# Patient Record
Sex: FEMALE
[De-identification: names, ages, dates, MRNs, and addresses within clinical notes are randomized per-mention and may not be internally consistent; named-entity substitution may affect disease eponyms.]

---

## 2020-03-11 ENCOUNTER — HOSPITAL ENCOUNTER (EMERGENCY)
Dept: HOSPITAL 97 - ER | Age: 55
Discharge: HOME | End: 2020-03-11
Payer: COMMERCIAL

## 2020-03-11 VITALS — TEMPERATURE: 97.4 F | OXYGEN SATURATION: 97 % | DIASTOLIC BLOOD PRESSURE: 74 MMHG | SYSTOLIC BLOOD PRESSURE: 149 MMHG
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
## 2020-03-11 DIAGNOSIS — Z23: ICD-10-CM

## 2020-03-11 DIAGNOSIS — S61.012A: Primary | ICD-10-CM
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
## 2020-03-11 PROCEDURE — 90471 IMMUNIZATION ADMIN: CPT

## 2020-03-11 PROCEDURE — 90714 TD VACC NO PRESV 7 YRS+ IM: CPT

## 2020-03-11 PROCEDURE — 99282 EMERGENCY DEPT VISIT SF MDM: CPT

## 2020-03-11 NOTE — XMS REPORT
Summary of Care

 Created on:2020



Patient:Amanda Traylor

Sex:Female

:1965

External Reference #:[id number]





Demographics







 Address  [address] [place] [address]

 

 Mobile Phone  [phone]

 

 Home Phone  [phone]

 

 Phone  [phone]

 

 Email Address  [email or web address]

 

 Preferred Language  English

 

 Marital Status  

 

 Hindu Affiliation  Unknown

 

 Race  White

 

 Ethnic Group   or 









Author







 Organization  [facility]

 

 Address  [address] [place] [address]









Support







 Name  Relationship  Address  Phone

 

 Dilan Traylor  Unavailable  Unavailable  [phone]

 

 Shobha Traylor  Unavailable  Unavailable  [phone]

 

 Reena Traylor  Unavailable  Unavailable  [phone]









Care Team Providers







 Name  Role  Phone

 

 Cameron Gutiérrez MD  Primary Care Provider  [phone]









Reason for Visit







 Reason  Comments

 

 Work Excuse  







Encounter Details







 Date  Type  Department  Care Team  Description

 

 2020  Telephone  [facility] Orthopaedic  Waldo Grace MD



  Work Excuse



     Surgery- [facility]



  [address]



  [place], [place] [address] [place] [address] [phone] (Fax)  







Allergies







 Active Allergy  Reactions  Severity  Noted Date  Comments

 

 Ibuprofen  Swelling    2017  

 

 Meloxicam  Swelling    2019  



documented as of this encounter (statuses as of 2020)



Medications







 Medication  Sig  Dispensed  Refills  Start Date  End Date  Status

 

 HYDROcodone-acetaminophe  TK 1 TO 2 TS PO    0  10/26/2017    Active



 n 7.5-325 mg per tablet  Q 4 TO 6 H PRN P          

 

 methylPREDNISolone  Take  by mouth    0      Active



 (METHYLPRED DP) 4 mg  SEE-INSTRUCTIONS          



 tablets  . follow package          



   directions          

 

 gabapentin 300 mg  Take 1 capsule  60 capsule  2  2019  Active



 capsuleIndications:  by mouth 2 (two)        0  



 Tibialis posterior  times daily for          



 tendinopathy  90 days.          

 

 methocarbamol 500 mg  Take 1 tablet by  60 tablet  2  2019  
Active



 tabletIndications:  mouth 2 (two)        0  



 Tibialis posterior  times daily for          



 tendinopathy  90 days.          



documented as of this encounter (statuses as of 2020)



Active Problems







 Problem  Noted Date

 

 Obesity (BMI 30-39.9)  2019

 

 Equinovalgus, acquired, left  2019

 

 Tibialis posterior tendinopathy  2019

 

 Impingement syndrome of left ankle  2019

 

 Achilles tendon contracture, left  2019



documented as of this encounter (statuses as of 2020)



Social History







 Tobacco Use  Types  Packs/Day  Years Used  Date

 

 Never Smoker        









 Smokeless Tobacco: Never Used      









 Sex Assigned at Birth  Date Recorded

 

 Not on file  









 Job Start Date  Occupation  Industry

 

 Not on file  Not on file  Not on file









 Travel History  Travel Start  Travel End









 No recent travel history available.



documented as of this encounter



Last Filed Vital Signs

Not on filedocumented in this encounter



Plan of Treatment







 Date  Type  Specialty  Care Team  Description

 

 2020  Office Visit  Orthopedic Surgery  Waldo Grace MD



  



       [address] [place] [id number] [phone] (Fax)  









 Health Maintenance  Due Date  Last Done  Comments

 

 HEPATITIS C (HCV) SCREEN  1965    

 

 DTaP,Tdap,and Td Vaccines (1 -  1975    



 Tdap)      

 

 PAP SMEAR  1985    

 

 Breast Cancer Screening  2004    



 (MAMMOGRAM)      

 

 COLONOSCOPY  2014    

 

 Zoster Recombinant Vaccine  2014    



 (SHINGRIX) (1 of 2)      

 

 INFLUENZA VACCINE (#1)  2019    

 

 PNEUMOCOCCAL 0-64 YEARS COMBINED  Aged Out    No longer eligible based on



 SERIES      patient's age to complete this



       topic



documented as of this encounter



Implants







 Implanted  Type  Area    Device  Shelf  Model /



         Identifier  Expiration  Serial / Lot



           Date  

 

 Screw Bone Cannulated Compression Headless 7.5mm Estefanía 45mml  SCREW  Left:  
[employer]      -2257 /



 Implanted: Qty: 2 on 2019 by Waldo Grace MD at [facility] SPECIALTY 
[facility] [place] [facility]    Leg        N/A /



             [id number] [id number] 19

 

 Screw Bone Cannulated Compression Headless 4.7mm Estefanía 40mml  SCREW  Left:  
[employer]      -9102 /



 Implanted: Qty: 1 on 2019 [facility]        N/A /



             AUTO1 LOAD2 19

 

 Screw Bone Cannulated Compression Headless 4.7mm Estefanía 35mml  SCREW  Left:  
[employer]       /



 Implanted: Qty: 1 on 2019 by Waldo Grace MD at [facility] AT [facility]    Leg        N/A /



             AUTO1 LOAD2 19

 

 Cortical Block, [facility] Tri Freeze Dried 1.51.9 Cm #1033-12 
- [id number]  TISSUE  Left:  [employer]    2021  1033- /



 Implanted: Qty: 1 on 2019 by Waldo Grace MD at [facility] AT [facility]      [id number] /



             



documented as of this encounter



Results

Not on filedocumented in this encounter



Insurance







 Payer  Benefit Plan / Group  Subscriber ID  Effective Dates  Phone  Address  
Type

 

 AETNA  AETNA O  [id number]  2016-Present      HMO



documented as of this encounter

## 2020-03-11 NOTE — XMS REPORT
Summary of Care

 Created on:2019



Patient:Amanda Traylor

Sex:Female

:1965

External Reference #:[id number]





Demographics







 Address  [address] [place] [address]

 

 Mobile Phone  [phone]

 

 Home Phone  [phone]

 

 Phone  [phone]

 

 Email Address  [email or web address]

 

 Preferred Language  English

 

 Marital Status  

 

 Yazdanism Affiliation  Unknown

 

 Race  White

 

 Ethnic Group   or 









Author







 Organization  [facility]

 

 Address  [address] [place] [address]









Support







 Name  Relationship  Address  Phone

 

 Dilan Traylor  Unavailable  Unavailable  [phone]

 

 Reena Luu  Unavailable  Unavailable  [phone]

 

 Shobha Traylor  Unavailable  Unavailable  [phone]









Care Team Providers







 Name  Role  Phone

 

 Cameron Gutiérrez MD  Primary Care Provider  [phone]









Encounter Details







 Date  Type  Department  Care Team  Description

 

 2019  Orders Only  [facility]



  Doctor Unassigned, No  



     [address] [facility]



  Name



  



     [place] [address]  [address] [place] [address]  







Allergies

Not on Filedocumented as of this encounter (statuses as of 2019)



Medications

Not on filedocumented as of this encounter (statuses as of 2019)



Active Problems

Not on filedocumented as of this encounter (statuses as of 2019)



Social History







 Tobacco Use  Types  Packs/Day  Years Used  Date

 

 Never Assessed        









 Sex Assigned at Birth  Date Recorded

 

 Not on file  









 Job Start Date  Occupation  Industry

 

 Not on file  Not on file  Not on file









 Travel History  Travel Start  Travel End









 No recent travel history available.



documented as of this encounter



Last Filed Vital Signs

Not on filedocumented in this encounter



Plan of Treatment







 Health Maintenance  Due Date  Last Done  Comments

 

 HEPATITIS C (HCV) SCREEN  1965    

 

 DTaP,Tdap,and Td Vaccines (1 -  1983    



 Tdap)      

 

 PAP SMEAR  1985    

 

 MAMMOGRAM  2004    

 

 COLONOSCOPY  2014    

 

 Zoster Recombinant Vaccine  2014    



 (SHINGRIX) (1 of 2)      

 

 INFLUENZA VACCINE (#1)  2019    

 

 PNEUMOCOCCAL 0-64 YEARS COMBINED  Aged Out    No longer eligible based on



 SERIES      patient's age to complete this



       topic



documented as of this encounter



Procedures







 Procedure Name  Priority  Date/Time  Associated Diagnosis  Comments

 

 ASSIGNMENT OF BENEFITS  Routine  2019  3:12 PM CDT    



documented in this encounter



Results

Not on filedocumented in this encounter



Insurance







 Payer  Benefit Plan / Group  Subscriber ID  Effective Dates  Phone  Address  
Type

 

 AETNA  AETNA HMO  [id number]  2016-Present      HMO



documented as of this encounter

## 2020-03-11 NOTE — XMS REPORT
Summary of Care

 Created on:February 10, 2020



Patient:Amanda Traylor

Sex:Female

:1965

External Reference #:[id number]





Demographics







 Address  [address] [place] [address]

 

 Mobile Phone  [phone]

 

 Home Phone  [phone]

 

 Phone  [phone]

 

 Email Address  [email or web address]

 

 Preferred Language  English

 

 Marital Status  

 

 Confucianism Affiliation  Unknown

 

 Race  White

 

 Ethnic Group   or 









Author







 Organization  [facility]

 

 Address  [address] [place] [address]









Support







 Name  Relationship  Address  Phone

 

 Dilan Traylor  Unavailable  Unavailable  [phone]

 

 Shobha Traylor  Unavailable  Unavailable  [phone]

 

 Reena Traylor  Unavailable  Unavailable  [phone]









Care Team Providers







 Name  Role  Phone

 

 Cameron Gutiérrez MD  Primary Care Provider  [phone]









Reason for Referral

 (Routine)





 Status  Reason  Specialty  Diagnoses /  Referred By  Referred To



       Procedures  Contact  Contact

 

 New Request    Physical Therapy  Diagnoses



Equinovalgus, acquired, left  Panchbhavi,  



       



Procedures



CONSULT/REFERRAL PHYSICAL THERAPY  MD Waldo



  



         [address] [place] [address]



  



         Phone:  



         [phone]



  



         Fax:  



         [phone]  





Radiology Services (Routine)





 Status  Reason  Specialty  Diagnoses /  Referred By  Referred To



       Procedures  Contact  Contact

 

 New Request    Diagnostic  Diagnoses



Left foot pain  Panchbhavi,  



     Radiology  



Procedures



XR FOOT 3+ VW LEFT  MD Waldo



  



         [address] [place] [address]



  



         Phone:  



         [phone]



  



         Fax:  



         [phone]  









Reason for Visit







 Reason  Comments

 

 Follow-up  left foot pain



Other (Routine)





 Status  Reason  Specialty  Diagnoses /  Referred By  Referred To



       Procedures  Contact  Contact

 

 Authorized    Orthopedic Surgery  Diagnoses



Tibialis posterior tendinopathy  Panchbhavi,  Panchbhavi,



       



Procedures



Discharge Follow-up: Specialty Provider WALDO GRACE; 2 Weeks  MD Waldo Haas MD







         [address] [place] [address]



  [address]







         Phone:  Phone:



         [phone]







         Fax:  Fax:



         [phone]









Encounter Details







 Date  Type  Department  Care Team  Description

 

 02/10/2020  Office Visit  [facility] Orthopaedic  Panchbhavi,  Left foot pain 
(Primary Dx);



     [facility] [place]  MD Waldo



  Equinovalgus, acquired, left



     [place]



  [address]



  



     [address] [place], [place]  



     [address]



  [address]



  



     [place], [place]  [phone] [address] [phone]  (Fax)  







Allergies







 Active Allergy  Reactions  Severity  Noted Date  Comments

 

 Ibuprofen  Swelling    2017  

 

 Meloxicam  Swelling    2019  



documented as of this encounter (statuses as of 02/10/2020)



Medications







 Medication  Sig  Dispensed  Refills  Start Date  End Date  Status

 

 HYDROcodone-acetaminophe  TK 1 TO 2 TS PO    0  10/26/2017    Active



 n 7.5-325 mg per tablet  Q 4 TO 6 H PRN P          

 

 methylPREDNISolone  Take  by mouth    0      Active



 (METHYLPRED DP) 4 mg  SEE-INSTRUCTIONS          



 tablets  . follow package          



   directions          

 

 gabapentin 300 mg  Take 1 capsule  60 capsule  2  2019  Active



 capsuleIndications:  by mouth 2 (two)        0  



 Tibialis posterior  times daily for          



 tendinopathy  90 days.          

 

 methocarbamol 500 mg  Take 1 tablet by  60 tablet  2  2019  
Active



 tabletIndications:  mouth 2 (two)        0  



 Tibialis posterior  times daily for          



 tendinopathy  90 days.          



documented as of this encounter (statuses as of 02/10/2020)



Active Problems







 Problem  Noted Date

 

 Obesity (BMI 30-39.9)  2019

 

 Equinovalgus, acquired, left  2019

 

 Tibialis posterior tendinopathy  2019

 

 Impingement syndrome of left ankle  2019

 

 Achilles tendon contracture, left  2019



documented as of this encounter (statuses as of 02/10/2020)



Social History







 Tobacco Use  Types  Packs/Day  Years Used  Date

 

 Never Smoker        









 Smokeless Tobacco: Never Used      









 Sex Assigned at Birth  Date Recorded

 

 Not on file  









 Job Start Date  Occupation  Industry

 

 Not on file  Not on file  Not on file









 Travel History  Travel Start  Travel End









 No recent travel history available.



documented as of this encounter



Last Filed Vital Signs







 Vital Sign  Reading  Time Taken  Comments

 

 Blood Pressure  -  -  

 

 Pulse  -  -  

 

 Temperature  36.2 C (97.1 F)  02/10/2020 10:26 AM CST  

 

 Respiratory Rate  -  -  

 

 Oxygen Saturation  -  -  

 

 Inhaled Oxygen Concentration  -  -  

 

 Weight  87.5 kg (193 lb)  02/10/2020 10:26 AM CST  

 

 Height  167.6 cm (5' 6")  02/10/2020 10:26 AM CST  

 

 Body Mass Index  31.15  02/10/2020 10:26 AM CST  



documented in this encounter



Progress Notes

Ulysses Sanchez MD - 02/10/2020 10:00 AM CSTOrthopaedic Post Op Visit

2/10/2020

Amanda Traylor



Subjective:

Amanda Traylor is a 54 year old female who is s/p  Left side 1. Medial 
displacement calcaneal osteotomy.

2. Achilles tendon lengthening.

3. Posterior tibial tendon exploration repair.

4. Spring ligament repair.

5. First metatarsocuneiform joint arthrodesis with allograft.

6. Intraoperative fluoroscopy and assessment.



She is doing well in her post op course. She has been NWB compliant. She denies 
fevers or chills.



POD/DOS 19



Interval hx, 2019:

Amanda Traylor is a 54 year old female here today for follow up. She has been 
compliant with weight bearing restrictions. She has no complaints at this time.



Interval hx 2/10/2020: Patient returns to clinic. Patient reports that she fell 
off her patio on . She reports tenderness to medial foot, worse at 
night.



Objective:

Temp 36.2 C (97.1 F) (Temporal Artery)  | Ht 66" (167.6 cm)  | Wt 87.5 kg (
193 lb)  | BMI 31.15 kg/m

Physical Exam:

Surgical incisions well healed

Foot wwp



Relevant laboratory values and imaging has been reviewed



XR: hardware in place without complication. Calcaneus osteotomy and tmt 
arthrodesis well healed.



Assessment and Plan:

Amanda Traylor is a 54 year old female s/p procedures listed above with routine 
post operative course without post operative significant complications



Plan



- compression hose

- vitamin C 1000mg, calcium and vitamin D

- wean out of boot

- arch support with carbon plate

- WBAT LLE

- XR left foot at follow up.

- Follow up 6 weeks

- PT



Electronically signed by Ulysses Sanchez MD at 02/10/2020 12:58 PM 
CSTdocumented in this encounter



Plan of Treatment







 Date  Type  Specialty  Care Team  Description

 

 2020  Office Visit  Orthopedic Surgery  Waldo Grace MD



  



       [address] [place] [address]



  



       [phone] (Fax)  









 Health Maintenance  Due Date  Last Done  Comments

 

 HEPATITIS C (HCV) SCREEN  1965    

 

 DTaP,Tdap,and Td Vaccines (1 -  1975    



 Tdap)      

 

 PAP SMEAR  1985    

 

 Breast Cancer Screening  2004    



 (MAMMOGRAM)      

 

 COLONOSCOPY  2014    

 

 Zoster Recombinant Vaccine  2014    



 (SHINGRIX) (1 of 2)      

 

 INFLUENZA VACCINE (#1)  2019    

 

 PNEUMOCOCCAL 0-64 YEARS COMBINED  Aged Out    No longer eligible based on



 SERIES      patient's age to complete this



       topic



documented as of this encounter



Implants







 Implanted  Type  Area    Device  Shelf  Model /



         Identifier  Expiration  Serial / Lot



           Date  

 

 Screw Bone Cannulated Compression Headless 7.5mm Estefanía 45mml  SCREW  Left:  
[employer]       /



 Implanted: Qty: 2 on 2019 by Waldo Grace MD at [facility] SPECIALTY 
[facility] AT [facility]    Leg        N/A /



             [id number] 19

 

 Screw Bone Cannulated Compression Headless 4.7mm Estefanía 40mml  SCREW  Left:  
[employer]       /



 Implanted: Qty: 1 on 2019 by Waldo Grace MD at [facility] AT [facility]    Leg        N/A /



             [id number] LOAD2 19

 

 Screw Bone Cannulated Compression Headless 4.7mm Estefanía 35mml  SCREW  Left:  
[employer]       /



 Implanted: Qty: 1 on 2019 by Waldo Grace MD at [facility] SPECIALTY 
[facility] AT [facility]        N/A /



             AUTO1 LOAD2 19

 

 Cortical Block, [facility] Tissue Services Tri Freeze Dried 1.51.9 Cm #1033-12 
- [id number]  TISSUE  Left:  [facility] Tissue    2021  1033-12 /



 Implanted: Qty: 1 on 2019 by Waldo Grace MD at [facility] SPECIALTY 
[facility] AT [facility]    Leg  [facility]      [id number] /



             



documented as of this encounter



Results

XR FOOT 3+ VW LEFT (02/10/2020 10:39 AM CST)





 Specimen

 

 









 Impressions  Performed At

 

  



  PACS/VR/DOSE



 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



  



   









 Narrative  Performed At

 

 EXAM:



  PACS/VR/DOSE



  



  



 XR FOOT 3+ VW LEFT



  



  



  



 HISTORY:



  



  



  



 left foot pain 



  



  



  



  



  



 COMPARISON:



  



  



  



 2019



  



  



  



 FINDINGS:



  



  



  



 Imaging of the foot demonstrates stable calcaneal osteotomy fixation  



 with



  



 medial shift of the posterior calcaneus/Achilles. Screw fixation through



  



 the tarsometatarsal junction is stable with minimal perihardware lucency



  



 seen about the medial proximal cuneiform fixation screw. Soft tissue



  



 swelling and osteopenia are present.



  



   









 Procedure Note

 

 [facility], Radiant Results Inft User - 02/10/2020 11:27 AM CST



EXAM:



 



 XR FOOT 3+ VW LEFT



 



 HISTORY:



 



 left foot pain



 



 



 COMPARISON:



 



 2019



 



 FINDINGS:



 



 Imaging of the foot demonstrates stable calcaneal osteotomy fixation with



 medial shift of the posterior calcaneus/Achilles. Screw fixation through



 the tarsometatarsal junction is stable with minimal perihardware lucency



 seen about the medial proximal cuneiform fixation screw. Soft tissue



 swelling and osteopenia are present.



 



 IMPRESSION



 



 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









 Performing Organization  Address  City[facility]  Phone Number

 

 PACS/VR/DOSE      



documented in this encounter



Visit Diagnoses







 Diagnosis

 

 Left foot pain - Primary







 Pain in limb

 

 Equinovalgus, acquired, left



documented in this encounter



Insurance







 Payer  Benefit Plan / Group  Subscriber ID  Effective Dates  Phone  Address  
Type

 

 [facility]  [id number]  2016-Present      O









 Guarantor Name  Account Type  Relation to  Date of Birth  Phone  Billing



     Patient      Address

 

 Amanda Traylor  Personal/Family  Self  1965  [phone]  [address]



         (Home)  [place] [address]



documented as of this encounter

## 2020-03-11 NOTE — XMS REPORT
Summary of Care

 Created on:2019



Patient:Amanda Traylor

Sex:Female

:1965

External Reference #:[id number]





Demographics







 Address  [address] [place] [address]

 

 Mobile Phone  [phone]

 

 Home Phone  [phone]

 

 Phone  [phone]

 

 Email Address  [email or web address]

 

 Preferred Language  English

 

 Marital Status  

 

 Alevism Affiliation  Unknown

 

 Race  White

 

 Ethnic Group   or 









Author







 Organization  [facility]

 

 Address  [address] [place] [address]









Support







 Name  Relationship  Address  Phone

 

 Dilan Traylor  Unavailable  Unavailable  [phone]

 

 Reena Luu  Unavailable  Unavailable  [phone]

 

 Shobha Traylor  Unavailable  Unavailable  [phone]









Care Team Providers







 Name  Role  Phone

 

 Cameron Gutiérrez MD  Primary Care Provider  [phone]









Reason for Referral

Radiology Services (Routine)





 Status  Reason  Specialty  Diagnoses /  Referred By  Referred To



       Procedures  Contact  Contact

 

 New Request    Diagnostic  Diagnoses



Pain  Kaiser, Jesus,  



     Radiology  



Procedures



XR ANKLE 3+ VW BILATERAL  FNP



  



         [address] [place] [address] 1.



  



         [place]  



         [address]



  



         Phone:  



         [phone]



  



         Fax:  



         [phone]  





Radiology Services (Routine)





 Status  Reason  Specialty  Diagnoses /  Referred By  Referred To



       Procedures  Contact  Contact

 

 New Request    Diagnostic  Diagnoses



Pain  [facility], Jesus,  



     Radiology  



Procedures



XR FOOT 3+ VW BILATERAL  FNP



  



         [address] [place] [address] 1.



  



         [place]  



         [address]



  



         Phone:  



         [phone]



  



         Fax:  



         [phone]  









Reason for Visit







 Reason  Comments

 

 New Patient  left ankle pain



 (Routine)





 Status  Reason  Specialty  Diagnoses /  Referred By  Referred To



       Procedures  Contact  Contact

 

 Closed    [facility]-ORTHOPAEDIC  Diagnoses



left ankle posterior tibials tear DOI -patient will bring CD & report MRI  [facility]-
[facility]  Panchbhavi,



     SURGERY /  



Procedures



CONSULT/REFERRAL ORTHOPAEDIC SURGERY



NEW VISIT (FIRST TIME)  Faculty



  MD Waldo







     Orthopedic Surgery    [address] [place] [address]



         



  [address] [place] [address]  Phone:



         [phone] [phone]







         Phone:  Fax:



         [phone]



         Fax:  



         [phone]  









Encounter Details







 Date  Type  Department  Care Team  Description

 

 2019  Office Visit  [facility] Orthopaedic  Panchbhavi,  Pes 
planovalgus (Primary Dx);



     Surgery- [place]  MD Waldo



  Pain



     [place]



  [address] [facility]



  



     [address] [place], [place]  



     1.211



  [address]



  



     [place], [place]  [phone] [address] [phone]  (Fax)  







Allergies

Not on Filedocumented as of this encounter (statuses as of 2019)



Medications

Not on filedocumented as of this encounter (statuses as of 2019)



Active Problems

Not on filedocumented as of this encounter (statuses as of 2019)



Social History







 Tobacco Use  Types  Packs/Day  Years Used  Date

 

 Never Smoker        









 Smokeless Tobacco: Never Used      









 Sex Assigned at Birth  Date Recorded

 

 Not on file  









 Job Start Date  Occupation  Industry

 

 Not on file  Not on file  Not on file









 Travel History  Travel Start  Travel End









 No recent travel history available.



documented as of this encounter



Last Filed Vital Signs







 Vital Sign  Reading  Time Taken  Comments

 

 Blood Pressure  132/74  2019  3:29 PM CDT  

 

 Pulse  71  2019  3:29 PM CDT  

 

 Temperature  36.7 C (98 F)  2019  3:29 PM CDT  

 

 Respiratory Rate  -  -  

 

 Oxygen Saturation  -  -  

 

 Inhaled Oxygen Concentration  -  -  

 

 Weight  88.6 kg (195 lb 4.8 oz)  2019  3:29 PM CDT  

 

 Height  165.1 cm (5' 5")  2019  3:29 PM CDT  

 

 Body Mass Index  32.5  2019  3:29 PM CDT  



documented in this encounter



Progress Notes

Ulysses Sanchez MD - 2019  3:10 PM CDTFormatting of this note might be 
different from the original.

Orthopaedic Foot Clinic Note

2019



CC: left foot pain

Subjective:

Amanda Traylor is a 54 year old female who presents left foot and ankle pain and 
swelling for several years. 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 
complains of everyday pain that limits everyday activities.



No DM or blood clots.



ROS

- HEENT: no vision changes, headaches, hearing changes

- Pulm: no shortness of breath, cough, dyspnea on exertion

- CV: no chest pain or palpitations

- Abd: no pain, nausea, vomiting, diarrhea, constipation

- MSK: per HPI

- Skin: no rashes, lesions

- Neuro: no numbness, radiculopathy

- Psych: no anxiety, depression



Objective:

Vitals:

 19 1529

BP: 132/74

Pulse: 71

Temp: 36.7 C (98 F)

TempSrc: Temporal Artery

Weight: 88.6 kg (195 lb 4.8 oz)

Height: 65" (165.1 cm)



Gen: no acute distress

Neuro: alert and oriented

Pulm: unlabored respirations, equal chest rise

MSK:

Collapsed arch

Achilles contracture

Hindfoot valgus

Bunion

Great toe pronation



Radiology:

Imaging reviewed

MRI OSH: post tib dysfunction



Assessment/Plan:

Amanda Traylor is a 54 year old female left pes planovalgus.



- Patient has elected surgery; calc osteotomy, achilles lengthening

- Patient seen and examined with Dr. Grace

- Follow up: surgery



Ulysses Sanchez MD

Orthopaedic Surgery

Electronically signed by Ulysses Sanchez MD at 2019  6:32 PM 
CDTdocumented in this encounter



Plan of Treatment







 Name  Type  Priority  Associated Diagnoses  Date/Time

 

 XR FOOT 3+ VW BILATERAL  IMAGING  Routine  Pain  2019  4:57 PM CDT

 

 XR ANKLE 3+ VW BILATERAL  IMAGING  Routine  Pain  2019  4:57 PM CDT









 Name  Type  Priority  Associated Diagnoses  Order Schedule

 

 XR FOOT 3+ VW BILATERAL  IMAGING  Routine  Pain  Expected: 2019,



         Expires: 2020

 

 XR ANKLE 3+ VW BILATERAL  IMAGING  Routine  Pain  Expected: 2019,



         Expires: 2020









 Health Maintenance  Due Date  Last Done  Comments

 

 HEPATITIS C (HCV) SCREEN  1965    

 

 DTaP,Tdap,and Td Vaccines (1 -  1983    



 Tdap)      

 

 PAP SMEAR  1985    

 

 MAMMOGRAM  2004    

 

 COLONOSCOPY  2014    

 

 Zoster Recombinant Vaccine  2014    



 (SHINGRIX) (1 of 2)      

 

 INFLUENZA VACCINE (#1)  2019    

 

 PNEUMOCOCCAL 0-64 YEARS COMBINED  Aged Out    No longer eligible based on



 SERIES      patient's age to complete this



       topic



documented as of this encounter



Results

Not on filedocumented in this encounter



Visit Diagnoses







 Diagnosis

 

 Pes planovalgus - Primary







 Other congenital valgus deformity of feet

 

 Pain







 Generalized pain



documented in this encounter



Insurance







 Payer  Benefit Plan / Group  Subscriber ID  Effective Dates  Phone  Address  
Type

 

 AETNA  AETNA O  [id number]  2016-Present      HMO









 Guarantor Name  Account Type  Relation to  Date of Birth  Phone  Billing



     Patient      Address

 

 Amanda Traylor  Personal/Family  Self  1965  [phone]  [address]



         (Home)  [place] [address]



documented as of this encounter

## 2020-03-11 NOTE — XMS REPORT
Patient Summary Document

 Created on:2020



Patient:RADHA FRENCH

Sex:Female

:1965

External Reference #:[id number]





Demographics







 Address  [address] [place] [address]

 

 Home Phone  [phone]

 

 Work Phone  [phone]

 

 Email Address  [email or web address]

 

 Preferred Language  Unknown

 

 Marital Status  Unknown

 

 Anabaptism Affiliation  Unknown

 

 Race  Unknown

 

 Additional Race(s)  Unavailable

 

 Ethnic Group  Unknown









Author







 Organization  [facility]

 

 Address  [address] [place] [address] Dr. Yin [address] [place] [address]

 

 Phone  [phone]









Care Team Providers







 Name  Role  Phone

 

 Unavailable  Unavailable  Unavailable









Problems

This patient has no known problems.



Allergies, Adverse Reactions, Alerts

This patient has no known allergies or adverse reactions.



Medications

This patient has no known medications.

## 2020-03-11 NOTE — XMS REPORT
Summary of Care

 Created on:2019



Patient:Amanda Traylor

Sex:Female

:1965

External Reference #:[id number]





Demographics







 Address  [address] [place] [address]

 

 Mobile Phone  [phone]

 

 Home Phone  [phone]

 

 Phone  [phone]

 

 Email Address  [email or web address]

 

 Preferred Language  English

 

 Marital Status  

 

 Mormonism Affiliation  Unknown

 

 Race  White

 

 Ethnic Group   or 









Author







 Organization  [facility]

 

 Address  [address] [place] [address]









Support







 Name  Relationship  Address  Phone

 

 Dilan Traylor  Unavailable  Unavailable  [phone]

 

 Reena Luu  Unavailable  Unavailable  [phone]

 

 Shobha Traylor  Unavailable  Unavailable  [phone]









Care Team Providers







 Name  Role  Phone

 

 Cameron Gutiérrez MD  Primary Care Provider  [phone]









Reason for Referral

Radiology Services (Routine)





 Status  Reason  Specialty  Diagnoses /  Referred By  Referred To



       Procedures  Contact  Contact

 

 New Request    Diagnostic  Diagnoses



Pain  Kaiser, Jesus,  



     Radiology  



Procedures



XR ANKLE 3+ VW BILATERAL  FNP



  



         [address] [place] [address] 1.



  



         [place]  



         [address]



  



         Phone:  



         [phone]



  



         Fax:  



         [phone]  





Radiology Services (Routine)





 Status  Reason  Specialty  Diagnoses /  Referred By  Referred To



       Procedures  Contact  Contact

 

 New Request    Diagnostic  Diagnoses



Pain  Kaiser, Jesus,  



     Radiology  



Procedures



XR FOOT 3+ VW BILATERAL  FNP



  



         [address] [place] [address] 1.



  



         [place]  



         [address]



  



         Phone:  



         [phone]



  



         Fax:  



         [phone]  









Reason for Visit







 Reason  Comments

 

 New Patient  left ankle pain



 (Routine)





 Status  Reason  Specialty  Diagnoses /  Referred By  Referred To



       Procedures  Contact  Contact

 

 Closed    [facility]  Diagnoses



left ankle posterior tibials tear DOI -patient will bring CD & report MRI  [facility]-
[facility]  Panchbhavi,



     SURGERY /  



Procedures



CONSULT/REFERRAL ORTHOPAEDIC SURGERY



NEW VISIT (FIRST TIME)  Faculty



  MD Waldo







     Orthopedic Surgery    [address] [place] [address]



         



  [address] [place] [address]  Phone:



         [phone]







         Phone:  Fax:



         [phone]



         Fax:  



         [phone]  









Encounter Details







 Date  Type  Department  Care Team  Description

 

 2019  Office Visit  [facility] Orthopaedic  Panchbhavi,  Pes 
planovalgus (Primary Dx);



     Surgery- Roslyn Haas MD



  Pain;



     [place]



  [address]



  Equinovalgus, acquired, left;



     [address] [place], [place]  Tibialis posterior tendinopathy;



     1.211



  [address]



  Achilles tendon contracture, left;



     [place], [place]  [phone]



  Impingement syndrome of left ankle



     [address] [phone]  (Fax)  







Allergies

Not on Filedocumented as of this encounter (statuses as of 2019)



Medications

No known medicationsdocumented as of this encounter (statuses as of 2019)



Active Problems







 Problem  Noted Date

 

 Equinovalgus, acquired, left  2019

 

 Tibialis posterior tendinopathy  2019

 

 Impingement syndrome of left ankle  2019

 

 Achilles tendon contracture, left  2019



documented as of this encounter (statuses as of 2019)



Social History







 Tobacco Use  Types  Packs/Day  Years Used  Date

 

 Never Smoker        









 Smokeless Tobacco: Never Used      









 Sex Assigned at Birth  Date Recorded

 

 Not on file  









 Job Start Date  Occupation  Industry

 

 Not on file  Not on file  Not on file









 Travel History  Travel Start  Travel End









 No recent travel history available.



documented as of this encounter



Last Filed Vital Signs







 Vital Sign  Reading  Time Taken  Comments

 

 Blood Pressure  132/74  2019  3:29 PM CDT  

 

 Pulse  71  2019  3:29 PM CDT  

 

 Temperature  36.7 C (98 F)  2019  3:29 PM CDT  

 

 Respiratory Rate  -  -  

 

 Oxygen Saturation  -  -  

 

 Inhaled Oxygen Concentration  -  -  

 

 Weight  88.6 kg (195 lb 4.8 oz)  2019  3:29 PM CDT  

 

 Height  165.1 cm (5' 5")  2019  3:29 PM CDT  

 

 Body Mass Index  32.5  2019  3:29 PM CDT  



documented in this encounter



Progress Notes

Ulysses Sanchez MD - 2019  3:10 PM CDTFormatting of this note might be 
different from the original.

Orthopaedic Foot Clinic Note

2019



CC: left foot pain

Subjective:

Amanda Traylor is a 54 year old female who presents left foot and ankle pain and 
swelling for several years. 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 
complains of everyday pain that limits everyday activities.



Patient tried and failed conservative methods including footwear, inserts and 
activity modifications, orthotics, pain meds, now problem affect ADLs.



No history suggestive of significant comorbs including DM, bleeds or clots in 
patient or family



No DM or blood clots.



ROS

- HEENT: no vision changes, headaches, hearing changes

- Pulm: no shortness of breath, cough, dyspnea on exertion

- CV: no chest pain or palpitations

- Abd: no pain, nausea, vomiting, diarrhea, constipation

- MSK: per HPI

- Skin: no rashes, lesions

- Neuro: no numbness, radiculopathy

- Psych: no anxiety, depression



Objective:

Vitals:

 19 1529

BP: 132/74

Pulse: 71

Temp: 36.7 C (98 F)

TempSrc: Temporal Artery

Weight: 88.6 kg (195 lb 4.8 oz)

Height: 65" (165.1 cm)



Gen: no acute distress

Neuro: alert and oriented

Pulm: unlabored respirations, equal chest rise

MSK:

Collapsed arch

Achilles contracture

Hindfoot valgus - flexible

Bunion with callus

Great toe pronation

NVI



Radiology:

Imaging reviewed

MRI OSH: post tib dysfunction



Assessment/Plan:

Amanda Traylor is a 54 year old female left pes planovalgus.



  ICD-10-CM ICD-9-CM

1. Pes planovalgus Q66.6 754.69

2. Pain R52 780.96

3. Equinovalgus, acquired, left M21.6X2 736.72

4. Tibialis posterior tendinopathy M67.979 727.9

5. Achilles tendon contracture, left M67.02 727.81

6. Impingement syndrome of left ankle M25.872 726.79



- Patient has elected surgery;



1. Medial displacement calcaneal osteotomy,



2. achilles lengthening



3. PTT tendon exploration debridement and repair



4. FDL possible tendon transfer



5. Intraoperative fluoroscopy and assessment.



45 min



Large frag cannulated screws



A clear explanation was given to the patient regarding the condition present, 
and the available conservative and surgical options. It was emphasized that the 
risks and benefits of surgery include but are not limited to infection, wound 
healing problems, damage to adjacent structures such as nerves, blood vessels 
and tendons, long term disability and pain, arthritis, hypersensitivity, deep 
vein thrombosis, pulmonary embolism, broken hardware, failure of surgery, need 
for further procedures at time ofsurgery or later, cast related problems, loss 
of limb or life.

The patient was given an explanation and the patient voiced understanding of 
what to expect after the surgery, the limitations,  the likely duration for 
post operative recovery and the instructions that are to be followed.

At the end the patient was invited to seek clarification or ask further 
questions but there were none. The patient voiced understanding of the entire 
consultation and was given contact details to get in touch with us or emergency 
services as and when needed.



Informed consent discussed with the patient, including: condition, proposed care
, treatments and services, alternative forms of treatment, and risks of no 
treatment.  Details discussed around the procedures to be used, and the risks 
and hazards involved, potential benefits, and side effects of the patients 
proposed care, treatment, and services; the likelihood of the patient achieving 
his or her goals; and any potential problems that might occur during 
recuperation. Reasonable alternative also discussed with the patients 
proposed care, treatment, and services.  The discussion encompasses risks, 
benefits, and side effects related to the alternative and risks related to not 
receiving the proposed care, treatment, and services.



- Patient seen and examined with Dr. Grace

- Follow up: surgery



Ulysses Sanchez MD

Orthopaedic Surgery

Electronically signed by [place] Grace MD at 2019  4:11 PM 
CDTdocumented in this encounter



Plan of Treatment







 Health Maintenance  Due Date  Last Done  Comments

 

 HEPATITIS C (HCV) SCREEN  1965    

 

 DTaP,Tdap,and Td Vaccines (1 -  1983    



 Tdap)      

 

 PAP SMEAR  1985    

 

 MAMMOGRAM  2004    

 

 COLONOSCOPY  2014    

 

 Zoster Recombinant Vaccine  2014    



 (SHINGRIX) (1 of 2)      

 

 INFLUENZA VACCINE (#1)  2019    

 

 PNEUMOCOCCAL 0-64 YEARS COMBINED  Aged Out    No longer eligible based on



 SERIES      patient's age to complete this



       topic



documented as of this encounter



Results

XR ANKLE 3+ VW BILATERAL (2019  4:57 PM CDT)





 Specimen

 

 









 Impressions  Performed At

 

  



  PACS/VR/DOSE



 Bilateral mid foot collapse which can be seen with spring



  



 ligament/posterior tibialis tendon incompetency.



  



  



  



 Bilateral hallux valgus deformity.



  



  



  



 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



  



  



  



 No acute bony abnormality is present.



  



  



  



 Questionable syndesmotic widening about the left ankle.



  



   









 Narrative  Performed At

 

 EXAM:



  PACS/VR/DOSE



  



  



 XR ANKLE 3+ VW BILATERAL,



  



  



  



 EXAM:



  



  



  



 XR FOOT 3+ VW BILATERAL



  



  



  



 HISTORY:



  



  



  



 pain Weight bearing please with alignment views



  



  



  



  



  



 COMPARISON:



  



  



  



 None



  



  



  



 FINDINGS:



  



  



  



 Imaging of the left and right foot and left and right ankle demonstrates



  



 bilateral hallux valgus deformities. Left worse than right midfoot  



 collapse



  



 is seen with hindfoot valgus angulation. Widening of the left  



 syndesmosis



  



 is seen. Osseous debris overlies the posterior left tibial talar joint.



  



 Bilateral plantar calcaneal enthesophyte formation is present. Moderate



  



 left calcaneal cuboid joint space narrowing with subchondral sclerosis  



 and



  



 marginal osteophyte formation are seen.



  



   









 Procedure Note

 

 [facility], Radiant Results Inft User - 2019  7:29 PM CDT



EXAM:



 



 XR ANKLE 3+ VW BILATERAL,



 



 EXAM:



 



 XR FOOT 3+ VW BILATERAL



 



 HISTORY:



 



 pain Weight bearing please with alignment views



 



 



 COMPARISON:



 



 None



 



 FINDINGS:



 



 Imaging of the left and right foot and left and right ankle demonstrates



 bilateral hallux valgus deformities. Left worse than right midfoot collapse



 is seen with hindfoot valgus angulation. Widening of the left syndesmosis



 is seen. Osseous debris overlies the posterior left tibial talar joint.



 Bilateral plantar calcaneal enthesophyte formation is present. Moderate



 left calcaneal cuboid joint space narrowing with subchondral sclerosis and



 marginal osteophyte formation are seen.



 



 IMPRESSION



 



 Bilateral mid foot collapse which can be seen with spring



 ligament/posterior tibialis tendon incompetency.



 



 Bilateral hallux valgus deformity.



 



 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



 



 No acute bony abnormality is present.



 



 Questionable syndesmotic widening about the left ankle.









 Performing Organization  Address  City/State/Zipcode  Phone Number

 

 PACS/VR/DOSE      



XR FOOT 3+ VW BILATERAL (2019  4:57 PM CDT)





 Specimen

 

 









 Impressions  Performed At

 

  



  PACS/VR/DOSE



 Bilateral mid foot collapse which can be seen with spring



  



 ligament/posterior tibialis tendon incompetency.



  



  



  



 Bilateral hallux valgus deformity.



  



  



  



 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



  



  



  



 No acute bony abnormality is present.



  



  



  



 Questionable syndesmotic widening about the left ankle.



  



   









 Narrative  Performed At

 

 EXAM:



  PACS/VR/DOSE



  



  



 XR ANKLE 3+ VW BILATERAL,



  



  



  



 EXAM:



  



  



  



 XR FOOT 3+ VW BILATERAL



  



  



  



 HISTORY:



  



  



  



 pain Weight bearing please with alignment views



  



  



  



  



  



 COMPARISON:



  



  



  



 None



  



  



  



 FINDINGS:



  



  



  



 Imaging of the left and right foot and left and right ankle demonstrates



  



 bilateral hallux valgus deformities. Left worse than right midfoot  



 collapse



  



 is seen with hindfoot valgus angulation. Widening of the left  



 syndesmosis



  



 is seen. Osseous debris overlies the posterior left tibial talar joint.



  



 Bilateral plantar calcaneal enthesophyte formation is present. Moderate



  



 left calcaneal cuboid joint space narrowing with subchondral sclerosis  



 and



  



 marginal osteophyte formation are seen.



  



   









 Procedure Note

 

 [facility], Radiant Results Inft User - 2019  7:29 PM CDT



EXAM:



 



 XR ANKLE 3+ VW BILATERAL,



 



 EXAM:



 



 XR FOOT 3+ VW BILATERAL



 



 HISTORY:



 



 pain Weight bearing please with alignment views



 



 



 COMPARISON:



 



 None



 



 FINDINGS:



 



 Imaging of the left and right foot and left and right ankle demonstrates



 bilateral hallux valgus deformities. Left worse than right midfoot collapse



 is seen with hindfoot valgus angulation. Widening of the left syndesmosis



 is seen. Osseous debris overlies the posterior left tibial talar joint.



 Bilateral plantar calcaneal enthesophyte formation is present. Moderate



 left calcaneal cuboid joint space narrowing with subchondral sclerosis and



 marginal osteophyte formation are seen.



 



 IMPRESSION



 



 Bilateral mid foot collapse which can be seen with spring



 ligament/posterior tibialis tendon incompetency.



 



 Bilateral hallux valgus deformity.



 



 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



 



 No acute bony abnormality is present.



 



 Questionable syndesmotic widening about the left ankle.









 Performing Organization  Address  City/State/Zipcode  Phone Number

 

 PACS/VR/DOSE      



documented in this encounter



Visit Diagnoses







 Diagnosis

 

 Pes planovalgus - Primary







 Other congenital valgus deformity of feet

 

 Pain







 Generalized pain

 

 Equinovalgus, acquired, left

 

 Tibialis posterior tendinopathy

 

 Achilles tendon contracture, left







 Contracture of tendon (sheath)

 

 Impingement syndrome of left ankle



documented in this encounter



Insurance







 Payer  Benefit Plan / Group  Subscriber ID  Effective Dates  Phone  Address  
Type

 

 [employer]ET[employer]  [facility] [place]  [id number]  2016-Present      O









 Guarantor Name  Account Type  Relation to  Date of Birth  Phone  Billing



     Patient      Address

 

 Amanda Traylor  Personal/Family  Self  1965  [phone]  [address]



         (Home)  [place] [address]



documented as of this encounter

## 2020-03-11 NOTE — ER
Nurse's Notes                                                                                     

 [facility]                                                                 

Name: Amanda Traylor                                                                                

Age: 54 yrs                                                                                       

Sex: Female                                                                                       

: 1965                                                                                   

MRN: [id number]                                                                                   

Arrival Date: 2020                                                                          

Time: 18:30                                                                                       

Account#: [id number]                                                                            

Bed Treatment                                                                                     

Private MD: Severiano Irene                                                                         

Diagnosis: Laceration with foreign body of left thumb without damage to nail                      

                                                                                                  

Presentation:                                                                                     

                                                                                             

19:24 Chief complaint: Patient states: She was cutting vegetables and accidentally cut the    aj1 

      tip of her thumb. Laceration noted to left thumb. Bleeding controlled. Coronavirus          

      screen: The patient has NOT traveled to a country currently being monitored by the CDC      

      within the last 14 days. Ebola Screen: Patient denies travel to an Ebola-affected area      

      in the 21 days before illness onset. Initial Sepsis Screen: Does the patient meet any 2     

      criteria? No. Patient's initial sepsis screen is negative. Does the patient have a          

      suspected source of infection? Yes: Skin breakdown/wound. Risk Assessment: Do you want      

      to hurt yourself or someone else? Patient reports no desire to harm self or others.         

19:24 Method Of Arrival: Ambulatory                                                           [facility] 

19:24 Acuity: TOBIAS 4                                                                           aj1 

20:28 Onset of symptoms was 2020.                                                   bb  

                                                                                                  

Triage Assessment:                                                                                

19:26 General: Appears in no apparent distress. comfortable, Behavior is calm, cooperative,   aj1 

      appropriate for age. Pain: Denies pain. Neuro: Level of Consciousness is awake, alert,      

      obeys commands, Oriented to person, place, time, situation. Cardiovascular: Patient's       

      skin is warm and dry. Respiratory: Airway is patent Respiratory effort is even,             

      unlabored, Respiratory pattern is regular, symmetrical. Derm: Skin is. Injury               

      Description: Laceration sustained to palmar aspect of distal phalanx of left thumb.         

                                                                                                  

OB/GYN:                                                                                           

19:26 LMP N/A - Post-menopause                                                                aj1 

                                                                                                  

Historical:                                                                                       

- Allergies:                                                                                      

19:26 Motrin;                                                                                 aj1 

19:26 Mobic;                                                                                  aj1 

- PMHx:                                                                                           

19:26 None;                                                                                   aj1 

                                                                                                  

- Immunization history:: Flu vaccine is not up to date.                                           

- Social history:: Smoking status: Patient/guardian denies using tobacco.                         

                                                                                                  

                                                                                                  

Screenin:27 Abuse screen: Denies threats or abuse. Nutritional screening: No deficits noted.        bb  

      Tuberculosis screening: No symptoms or risk factors identified. Fall Risk None              

      identified.                                                                                 

                                                                                                  

Assessment:                                                                                       

20:27 General: Appears in no apparent distress. Behavior is calm, cooperative. Neuro: Level   bb  

      of Consciousness is awake, alert, obeys commands. Cardiovascular: No deficits noted.        

      Respiratory: Respiratory effort is even, unlabored, Respiratory pattern is regular.         

      Derm: Skin is pink, warm \T\ dry. Musculoskeletal: Circulation, motion, and sensation       

      intact. Reports lac to left thumb.                                                          

20:29 Reassessment: Patient is alert, oriented x 3, equal unlabored respirations, skin        bb  

      warm/dry/pink. pt verbalized understanding of and agrees to plan of care discharge          

      instructions given pt ambulated with steady gait accompanied by family bandage to left      

      thumb clean, dry and intact.                                                                

                                                                                                  

Vital Signs:                                                                                      

19:24  / 74; Pulse 76; Resp 18; Temp 97.4; Pulse Ox 97% on R/A;                         aj1 

19:27 Weight 84.37 kg (R); Height 5 ft. 6 in. (167.64 cm) (R);                                aj1 

19:27 Body Mass Index 30.02 (84.37 kg, 167.64 cm)                                             [facility] 

                                                                                                  

ED Course:                                                                                        

18:30 Patient arrived in ED.                                                                  ag5 

18:31 Severiano Irene MD is Private Physician.                                                 ag5 

19:25 Triage completed.                                                                       aj1 

19:26 Arm band placed on Patient placed in waiting room, Patient notified of wait time.       aj1 

20:05 [facility]                                                             la1 

20:05 Teofilo Frank MD is Attending Physician.                                             la1 

20:27 Patient has correct armband on for positive identification.                             bb  

20:27 No provider procedures requiring assistance completed. Patient did not have IV access   bb  

      during this emergency room visit.                                                           

                                                                                                  

Administered Medications:                                                                         

20:25 Drug: Tetanus-Diphtheria Toxoid Adult 0.5 ml {: Salon[employer]r. Exp:         bb  

      2022. Lot #: [id number]. } Route: IM; Site: right deltoid;                                

20:30 Follow up: Response: Medication administered at discharge.                              bb  

                                                                                                  

                                                                                                  

Outcome:                                                                                          

20:27 Discharge ordered by MD.                                                                la1 

20:30 Patient left the ED.                                                                    bb  

                                                                                                  

Signatures:                                                                                       

Saniya De La Rosa RN                     RN   aj1                                                  

Elizabet Schaffer RN                     RN   bb                                                   

Zachary Prakash FNP-C FNP-ClaShawn Frias                                [facility]                                                  

                                                                                                  

**************************************************************************************************

## 2020-03-11 NOTE — XMS REPORT
Summary of Care

 Created on:2020



Patient:Amanda Traylor

Sex:Female

:1965

External Reference #:[id number]





Demographics







 Address  [address] [place] [address]

 

 Mobile Phone  [phone]

 

 Home Phone  [phone]

 

 Phone  [phone]

 

 Email Address  [email or web address]

 

 Preferred Language  English

 

 Marital Status  

 

 Baptism Affiliation  Unknown

 

 Race  White

 

 Ethnic Group   or 









Author







 Organization  [facility]

 

 Address  [address] [place] [address]









Support







 Name  Relationship  Address  Phone

 

 Dilan Traylor  Unavailable  Unavailable  [phone]

 

 Shobha Traylor  Unavailable  Unavailable  [phone]

 

 Reena Traylor  Unavailable  Unavailable  [phone]









Care Team Providers







 Name  Role  Phone

 

 Cameron Gutiérrez MD  Primary Care Provider  [phone]









Reason for Referral

Radiology Services (Routine)





 Status  Reason  Specialty  Diagnoses /  Referred By  Referred To



       Procedures  Contact  Contact

 

 New Request    Diagnostic  Diagnoses



Left foot pain  Panchbhavi,  



     Radiology  



Procedures



XR FOOT 3+ VW SOBIA Haas MD



  



         [address] [place], [place]  



         [address]



  



         Phone:  



         [phone]



  



         Fax:  



         [phone]  









Reason for Visit

Radiology Services (Routine)





 Status  Reason  Specialty  Diagnoses /  Referred By  Referred To



       Procedures  Contact  Contact

 

 New Request    Diagnostic  Diagnoses



Left foot pain  Panchbhavi,  



     Radiology  



Procedures



XR FOOT 3+ VW SOBIA Haas MD



  



         [address] [place], [place]  



         [address]



  



         Phone:  



         [phone]



  



         Fax:  



         [phone]  









Encounter Details







 Date  Type  Department  Care Team  Description

 

 02/10/2020  Hospital Encounter  [facility]  Waldo Grace,  
Arrived



     [facility]



  MD



  



     [address]  



     [address]



  [id number]



  



     [phone] (Fax)  







Allergies







 Active Allergy  Reactions  Severity  Noted Date  Comments

 

 Ibuprofen  Swelling    2017  

 

 Meloxicam  Swelling    2019  



documented as of this encounter (statuses as of 2020)



Medications







 Medication  Sig  Dispensed  Refills  Start Date  End Date  Status

 

 HYDROcodone-acetaminophe  TK 1 TO 2 TS PO    0  10/26/2017    Active



 n 7.5-325 mg per tablet  Q 4 TO 6 H PRN P          

 

 methylPREDNISolone  Take  by mouth    0      Active



 (METHYLPRED DP) 4 mg  SEE-INSTRUCTIONS          



 tablets  . follow package          



   directions          

 

 gabapentin 300 mg  Take 1 capsule  60 capsule  2  2019  Active



 capsuleIndications:  by mouth 2 (two)        0  



 Tibialis posterior  times daily for          



 tendinopathy  90 days.          

 

 methocarbamol 500 mg  Take 1 tablet by  60 tablet  2  2019  
Active



 tabletIndications:  mouth 2 (two)        0  



 Tibialis posterior  times daily for          



 tendinopathy  90 days.          



documented as of this encounter (statuses as of 2020)



Active Problems







 Problem  Noted Date

 

 Obesity (BMI 30-39.9)  2019

 

 Equinovalgus, acquired, left  2019

 

 Tibialis posterior tendinopathy  2019

 

 Impingement syndrome of left ankle  2019

 

 Achilles tendon contracture, left  2019



documented as of this encounter (statuses as of 2020)



Social History







 Tobacco Use  Types  Packs/Day  Years Used  Date

 

 Never Smoker        









 Smokeless Tobacco: Never Used      









 Sex Assigned at Birth  Date Recorded

 

 Not on file  









 Job Start Date  Occupation  Industry

 

 Not on file  Not on file  Not on file









 Travel History  Travel Start  Travel End









 No recent travel history available.



documented as of this encounter



Last Filed Vital Signs

Not on filedocumented in this encounter



Plan of Treatment







 Date  Type  Specialty  Care Team  Description

 

 2020  Office Visit  Orthopedic Surgery  Waldo Grace MD



  



       [address], [place] [id number] [phone] (Fax)  









 Health Maintenance  Due Date  Last Done  Comments

 

 HEPATITIS C (HCV) SCREEN  1965    

 

 DTaP,Tdap,and Td Vaccines (1 -  1975    



 Tdap)      

 

 PAP SMEAR  1985    

 

 Breast Cancer Screening  2004    



 (MAMMOGRAM)      

 

 COLONOSCOPY  2014    

 

 Zoster Recombinant Vaccine  2014    



 (SHINGRIX) (1 of 2)      

 

 INFLUENZA VACCINE (#1)  2019    

 

 PNEUMOCOCCAL 0-64 YEARS COMBINED  Aged Out    No longer eligible based on



 SERIES      patient's age to complete this



       topic



documented as of this encounter



Implants







 Implanted  Type  Area    Device  Shelf  Model /



         Identifier  Expiration  Serial / Lot



           Date  

 

 Screw Bone Cannulated Compression Headless 7.5mm Estefanía 45mml  SCREW  Left:  
[employer]      -[id number] /



 Implanted: Qty: 2 on 2019 by Waldo Grace MD at [facility]    Leg        N/A /



             [id number] 19

 

 Screw Bone Cannulated Compression Headless 4.7mm Estefanía 40mml  SCREW  Left:  
[employer]      [id number] /



 Implanted: Qty: 1 on 2019 by Waldo Grace MD at [facility]    Leg        N/A /



             [id number] [id number] 19

 

 Screw Bone Cannulated Compression Headless 4.7mm Estefanía 35mml  SCREW  Left:  
[employer]      [id number] /



 Implanted: Qty: 1 on 2019 by Waldo Grace MD at [facility]    Leg        N/A /



             [id number] LOAD2 19

 

 Cortical Block, [facility] [employer] [facility] Tri Freeze Dried 1.51.9 Cm #1033-12 
- [id number]  TISSUE  Left:  [facility] [employer]    2021  1033-12 /



 Implanted: Qty: 1 on 2019 by Waldo Grace MD at [facility] AT [facility]      [id number] /



             



documented as of this encounter



Procedures







 Procedure Name  Priority  Date/Time  Associated Diagnosis  Comments

 

 XR FOOT  3+ VW LEFT  Routine  02/10/2020 10:39 AM  Left foot pain  Results for 
this



     CST    procedure are in



         the results



         section.



documented in this encounter



Results

XR FOOT 3+ VW LEFT (02/10/2020 10:39 AM CST)





 Specimen

 

 









 Impressions  Performed At

 

  



  PACS/VR/DOSE



 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



  



   









 Narrative  Performed At

 

 EXAM:



  PACS/VR/DOSE



  



  



 XR FOOT 3+ VW LEFT



  



  



  



 HISTORY:



  



  



  



 left foot pain 



  



  



  



  



  



 COMPARISON:



  



  



  



 2019



  



  



  



 FINDINGS:



  



  



  



 Imaging of the foot demonstrates stable calcaneal osteotomy fixation  



 with



  



 medial shift of the posterior calcaneus/Achilles. Screw fixation through



  



 the tarsometatarsal junction is stable with minimal perihardware lucency



  



 seen about the medial proximal cuneiform fixation screw. Soft tissue



  



 swelling and osteopenia are present.



  



   









 Procedure Note

 

 [facility], Radiant Results Inft User - 02/10/2020 11:27 AM CST



EXAM:



 



 XR FOOT 3+ VW LEFT



 



 HISTORY:



 



 left foot pain



 



 



 COMPARISON:



 



 2019



 



 FINDINGS:



 



 Imaging of the foot demonstrates stable calcaneal osteotomy fixation with



 medial shift of the posterior calcaneus/Achilles. Screw fixation through



 the tarsometatarsal junction is stable with minimal perihardware lucency



 seen about the medial proximal cuneiform fixation screw. Soft tissue



 swelling and osteopenia are present.



 



 IMPRESSION



 



 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









 Performing Organization  Address  [facility]  Phone Number

 

 PACS/VR/DOSE      



documented in this encounter



Visit Diagnoses







 Diagnosis

 

 Left foot pain







 Pain in limb



documented in this encounter



Insurance







 Payer  Benefit Plan / Group  Subscriber ID  Effective Dates  Phone  Address  
Type

 

 [id number]  2016-Present      O









 Guarantor Name  Account Type  Relation to  Date of Birth  Phone  Billing



     Patient      Address

 

 Amanda Traylor  Personal/Family  Self  1965  [phone]  [address]



         (Home)  [place] [address]



documented as of this encounter

## 2020-03-11 NOTE — XMS REPORT
Summary of Care

 Created on:February 10, 2020



Patient:Amanda Traylor

Sex:Female

:1965

External Reference #:[id number]





Demographics







 Address  [address] [place] [address]

 

 Mobile Phone  [phone]

 

 Home Phone  [phone]

 

 Phone  [phone]

 

 Email Address  [email or web address]

 

 Preferred Language  English

 

 Marital Status  

 

 Sikh Affiliation  Unknown

 

 Race  White

 

 Ethnic Group   or 









Author







 Organization  [facility]

 

 Address  [address] [place] [address]









Support







 Name  Relationship  Address  Phone

 

 Dilan Traylor  Unavailable  Unavailable  [phone]

 

 Shobha Traylor  Unavailable  Unavailable  [phone]

 

 Reena Traylor  Unavailable  Unavailable  [phone]









Care Team Providers







 Name  Role  Phone

 

 Cameron Gutiérrez MD  Primary Care Provider  [phone]









Reason for Referral

 (Routine)





 Status  Reason  Specialty  Diagnoses /  Referred By  Referred To



       Procedures  Contact  Contact

 

 New Request    Physical Therapy  Diagnoses



Equinovalgus, acquired, left  Panchbhavi,  



       



Procedures



CONSULT/REFERRAL PHYSICAL THERAPY  MD Waldo



  



         [address] [place] [address]



  



         Phone:  



         [phone]



  



         Fax:  



         [phone]  





Radiology Services (Routine)





 Status  Reason  Specialty  Diagnoses /  Referred By  Referred To



       Procedures  Contact  Contact

 

 New Request    Diagnostic  Diagnoses



Left foot pain  Panchbhavi,  



     Radiology  



Procedures



XR FOOT 3+ VW LEFT  MD Waldo



  



         [address] [place] [address]



  



         Phone:  



         [phone]



  



         Fax:  



         [phone]  









Reason for Visit







 Reason  Comments

 

 Follow-up  left foot pain



Other (Routine)





 Status  Reason  Specialty  Diagnoses /  Referred By  Referred To



       Procedures  Contact  Contact

 

 Authorized    Orthopedic Surgery  Diagnoses



Tibialis posterior tendinopathy  Panchbhavi,  Panchbhavi,



       



Procedures



Discharge Follow-up: Specialty Provider WALDO GRACE; 2 Weeks  MD Waldo Haas MD







         [address] [place] [address]



  [address]







         Phone:  Phone:



         [phone]







         Fax:  Fax:



         [phone]









Encounter Details







 Date  Type  Department  Care Team  Description

 

 02/10/2020  Office Visit  [facility] Orthopaedic  Panchbhavi,  Left foot pain 
(Primary Dx);



     Surgery- [place]  MD Waldo



  Equinovalgus, acquired, left



     [place]



  [address]



  



     [address] [place], [place]  



     [address]



  [address]



  



     [place]  [phone] [address] [phone]  (Fax)  







Allergies







 Active Allergy  Reactions  Severity  Noted Date  Comments

 

 Ibuprofen  Swelling    2017  

 

 Meloxicam  Swelling    2019  



documented as of this encounter (statuses as of 02/10/2020)



Medications







 Medication  Sig  Dispensed  Refills  Start Date  End Date  Status

 

 HYDROcodone-acetaminophe  TK 1 TO 2 TS PO    0  10/26/2017    Active



 n 7.5-325 mg per tablet  Q 4 TO 6 H PRN P          

 

 methylPREDNISolone  Take  by mouth    0      Active



 (METHYLPRED DP) 4 mg  SEE-INSTRUCTIONS          



 tablets  . follow package          



   directions          

 

 gabapentin 300 mg  Take 1 capsule  60 capsule  2  2019  Active



 capsuleIndications:  by mouth 2 (two)        0  



 Tibialis posterior  times daily for          



 tendinopathy  90 days.          

 

 methocarbamol 500 mg  Take 1 tablet by  60 tablet  2  2019  
Active



 tabletIndications:  mouth 2 (two)        0  



 Tibialis posterior  times daily for          



 tendinopathy  90 days.          



documented as of this encounter (statuses as of 02/10/2020)



Active Problems







 Problem  Noted Date

 

 Obesity (BMI 30-39.9)  2019

 

 Equinovalgus, acquired, left  2019

 

 Tibialis posterior tendinopathy  2019

 

 Impingement syndrome of left ankle  2019

 

 Achilles tendon contracture, left  2019



documented as of this encounter (statuses as of 02/10/2020)



Social History







 Tobacco Use  Types  Packs/Day  Years Used  Date

 

 Never Smoker        









 Smokeless Tobacco: Never Used      









 Sex Assigned at Birth  Date Recorded

 

 Not on file  









 Job Start Date  Occupation  Industry

 

 Not on file  Not on file  Not on file









 Travel History  Travel Start  Travel End









 No recent travel history available.



documented as of this encounter



Last Filed Vital Signs







 Vital Sign  Reading  Time Taken  Comments

 

 Blood Pressure  -  -  

 

 Pulse  -  -  

 

 Temperature  36.2 C (97.1 F)  02/10/2020 10:26 AM CST  

 

 Respiratory Rate  -  -  

 

 Oxygen Saturation  -  -  

 

 Inhaled Oxygen Concentration  -  -  

 

 Weight  87.5 kg (193 lb)  02/10/2020 10:26 AM CST  

 

 Height  167.6 cm (5' 6")  02/10/2020 10:26 AM CST  

 

 Body Mass Index  31.15  02/10/2020 10:26 AM CST  



documented in this encounter



Progress Notes

Ulysses Sanchez MD - 02/10/2020 10:00 AM CSTOrthopaedic Post Op Visit

2/10/2020

Amanda Traylor



Subjective:

Amanda Traylor is a 54 year old female who is s/p  Left side 1. Medial 
displacement calcaneal osteotomy.

2. Achilles tendon lengthening.

3. Posterior tibial tendon exploration repair.

4. Spring ligament repair.

5. First metatarsocuneiform joint arthrodesis with allograft.

6. Intraoperative fluoroscopy and assessment.



She is doing well in her post op course. She has been NWB compliant. She denies 
fevers or chills.



POD/DOS 19



Interval hx, 2019:

Amanda Traylor is a 54 year old female here today for follow up. She has been 
compliant with weight bearing restrictions. She has no complaints at this time.



Interval hx 2/10/2020: Patient returns to clinic. Patient reports that she fell 
off her patio on . She reports tenderness to medial foot, worse at 
night.



Objective:

Temp 36.2 C (97.1 F) (Temporal Artery)  | Ht 66" (167.6 cm)  | Wt 87.5 kg (
193 lb)  | BMI 31.15 kg/m

Physical Exam:

Surgical incisions well healed

Foot wwp



Relevant laboratory values and imaging has been reviewed



XR: hardware in place without complication. Calcaneus osteotomy and tmt 
arthrodesis well healed.



Assessment and Plan:

Amanda Traylor is a 54 year old female s/p procedures listed above with routine 
post operative course without post operative significant complications



Plan



- compression hose

- vitamin C 1000mg, calcium and vitamin D

- wean out of boot

- arch support with carbon plate

- WBAT LLE

- XR left foot at follow up.

- Follow up 6 weeks

- PT



Electronically signed by Ulysses Sanchez MD at 02/10/2020 12:58 PM 
CSTdocumented in this encounter



Plan of Treatment







 Date  Type  Specialty  Care Team  Description

 

 2020  Office Visit  Orthopedic Surgery  Waldo Grace MD



  



       [address] [place] [address]



  



       [phone] (Fax)  









 Health Maintenance  Due Date  Last Done  Comments

 

 HEPATITIS C (HCV) SCREEN  1965    

 

 DTaP,Tdap,and Td Vaccines (1 -  1975    



 Tdap)      

 

 PAP SMEAR  1985    

 

 Breast Cancer Screening  2004    



 (MAMMOGRAM)      

 

 COLONOSCOPY  2014    

 

 Zoster Recombinant Vaccine  2014    



 (SHINGRIX) (1 of 2)      

 

 INFLUENZA VACCINE (#1)  2019    

 

 PNEUMOCOCCAL 0-64 YEARS COMBINED  Aged Out    No longer eligible based on



 SERIES      patient's age to complete this



       topic



documented as of this encounter



Implants







 Implanted  Type  Area    Device  Shelf  Model /



         Identifier  Expiration  Serial / Lot



           Date  

 

 Screw Bone Cannulated Compression Headless 7.5mm Estefanía 45mml  SCREW  Left:  
[employer]       /



 Implanted: Qty: 2 on 2019 by Waldo Grace MD at [facility] SPECIALTY 
[facility] AT [facility]    Leg        N/A /



             [id number] 19

 

 Screw Bone Cannulated Compression Headless 4.7mm Estefanía 40mml  SCREW  Left:  
[employer]       /



 Implanted: Qty: 1 on 2019 by Waldo Grace MD at [facility] AT [facility]    Leg        N/A /



             AUTO1 LOAD2 19

 

 Screw Bone Cannulated Compression Headless 4.7mm Estefanía 35mml  SCREW  Left:  
[employer]       /



 Implanted: Qty: 1 on 2019 by Waldo Grace MD at [facility] SPECIALTY 
[facility] AT [facility]        N/A /



             AUTO1 LOAD2 19

 

 Cortical Block, [facility] Tissue Services Tri Freeze Dried 1.51.9 Cm #1033-12 
- [id number]  TISSUE  Left:  [facility] Tissue    2021  1033-12 /



 Implanted: Qty: 1 on 2019 by Waldo Grace MD at [facility] SPECIALTY 
[facility] AT [facility]    Leg  [facility]      [id number] /



             



documented as of this encounter



Results

XR FOOT 3+ VW LEFT (02/10/2020 10:39 AM CST)





 Specimen

 

 









 Impressions  Performed At

 

  



  PACS/VR/DOSE



 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



  



   









 Narrative  Performed At

 

 EXAM:



  PACS/VR/DOSE



  



  



 XR FOOT 3+ VW LEFT



  



  



  



 HISTORY:



  



  



  



 left foot pain 



  



  



  



  



  



 COMPARISON:



  



  



  



 2019



  



  



  



 FINDINGS:



  



  



  



 Imaging of the foot demonstrates stable calcaneal osteotomy fixation  



 with



  



 medial shift of the posterior calcaneus/Achilles. Screw fixation through



  



 the tarsometatarsal junction is stable with minimal perihardware lucency



  



 seen about the medial proximal cuneiform fixation screw. Soft tissue



  



 swelling and osteopenia are present.



  



   









 Procedure Note

 

 [facility], Radiant Results Inft User - 02/10/2020 11:27 AM CST



EXAM:



 



 XR FOOT 3+ VW LEFT



 



 HISTORY:



 



 left foot pain



 



 



 COMPARISON:



 



 2019



 



 FINDINGS:



 



 Imaging of the foot demonstrates stable calcaneal osteotomy fixation with



 medial shift of the posterior calcaneus/Achilles. Screw fixation through



 the tarsometatarsal junction is stable with minimal perihardware lucency



 seen about the medial proximal cuneiform fixation screw. Soft tissue



 swelling and osteopenia are present.



 



 IMPRESSION



 



 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









 Performing Organization  Address  City/State/[facility] Centercode  Phone Number

 

 PACS/VR/DOSE      



documented in this encounter



Visit Diagnoses







 Diagnosis

 

 Left foot pain - Primary







 Pain in limb

 

 Equinovalgus, acquired, left



documented in this encounter



Insurance







 Payer  Benefit Plan / Group  Subscriber ID  Effective Dates  Phone  Address  
Type

 

 [facility]  [id number]  2016-Present      O









 Guarantor Name  Account Type  Relation to  Date of Birth  Phone  Billing



     Patient      Address

 

 Amanda Traylor  Personal/Family  Self  1965  [phone]  [address]



         (Home)  [place] [address]



documented as of this encounter

## 2020-03-11 NOTE — XMS REPORT
Summary of Care

 Created on:2020



Patient:Amanda Traylor

Sex:Female

:1965

External Reference #:[id number]





Demographics







 Address  [address] [place] [address]

 

 Mobile Phone  [phone]

 

 Home Phone  [phone]

 

 Phone  [phone]

 

 Email Address  [email or web address]

 

 Preferred Language  English

 

 Marital Status  

 

 Taoist Affiliation  Unknown

 

 Race  White

 

 Ethnic Group   or 









Author







 Organization  [facility]

 

 Address  [address] [place] [address]









Support







 Name  Relationship  Address  Phone

 

 Dilan Traylor  Unavailable  Unavailable  [phone]

 

 Shobha Traylor  Unavailable  Unavailable  [phone]

 

 Reena Traylor  Unavailable  Unavailable  [phone]









Care Team Providers







 Name  Role  Phone

 

 Cameron Gutiérrez MD  Primary Care Provider  [phone]









Reason for Visit







 Reason  Comments

 

 Forms  







Encounter Details







 Date  Type  Department  Care Team  Description

 

 2020  Telephone  [facility] Orthopaedic  Waldo Grace MD



  Forms



     Surgery- [facility]



  [address]



  [place] [address]



  



     [place] [address] [phone] (Fax)  







Allergies







 Active Allergy  Reactions  Severity  Noted Date  Comments

 

 Ibuprofen  Swelling    2017  

 

 Meloxicam  Swelling    2019  



documented as of this encounter (statuses as of 2020)



Medications







 Medication  Sig  Dispensed  Refills  Start Date  End Date  Status

 

 HYDROcodone-acetaminophe  TK 1 TO 2 TS PO    0  10/26/2017    Active



 n 7.5-325 mg per tablet  Q 4 TO 6 H PRN P          

 

 methylPREDNISolone  Take  by mouth    0      Active



 (METHYLPRED DP) 4 mg  SEE-INSTRUCTIONS          



 tablets  . follow package          



   directions          

 

 gabapentin 300 mg  Take 1 capsule  60 capsule  2  2019  Active



 capsuleIndications:  by mouth 2 (two)        0  



 Tibialis posterior  times daily for          



 tendinopathy  90 days.          

 

 methocarbamol 500 mg  Take 1 tablet by  60 tablet  2  2019  
Active



 tabletIndications:  mouth 2 (two)        0  



 Tibialis posterior  times daily for          



 tendinopathy  90 days.          



documented as of this encounter (statuses as of 2020)



Active Problems







 Problem  Noted Date

 

 Obesity (BMI 30-39.9)  2019

 

 Equinovalgus, acquired, left  2019

 

 Tibialis posterior tendinopathy  2019

 

 Impingement syndrome of left ankle  2019

 

 Achilles tendon contracture, left  2019



documented as of this encounter (statuses as of 2020)



Social History







 Tobacco Use  Types  Packs/Day  Years Used  Date

 

 Never Smoker        









 Smokeless Tobacco: Never Used      









 Sex Assigned at Birth  Date Recorded

 

 Not on file  









 Job Start Date  Occupation  Industry

 

 Not on file  Not on file  Not on file









 Travel History  Travel Start  Travel End









 No recent travel history available.



documented as of this encounter



Last Filed Vital Signs

Not on filedocumented in this encounter



Plan of Treatment







 Date  Type  Specialty  Care Team  Description

 

 2020  Office Visit  Orthopedic Surgery  Waldo Grace MD



  



       [address] [place] [id number] [phone] (Fax)  









 Health Maintenance  Due Date  Last Done  Comments

 

 HEPATITIS C (HCV) SCREEN  1965    

 

 DTaP,Tdap,and Td Vaccines (1 -  1975    



 Tdap)      

 

 PAP SMEAR  1985    

 

 Breast Cancer Screening  2004    



 (MAMMOGRAM)      

 

 COLONOSCOPY  2014    

 

 Zoster Recombinant Vaccine  2014    



 (SHINGRIX) (1 of 2)      

 

 INFLUENZA VACCINE (#1)  2019    

 

 PNEUMOCOCCAL 0-64 YEARS COMBINED  Aged Out    No longer eligible based on



 SERIES      patient's age to complete this



       topic



documented as of this encounter



Implants







 Implanted  Type  Area    Device  Shelf  Model /



         Identifier  Expiration  Serial / Lot



           Date  

 

 Screw Bone Cannulated Compression Headless 7.5mm Estefanía 45mml  SCREW  Left:  
[employer]      -[id number] /



 Implanted: Qty: 2 on 2019 by Waldo Grace MD at [facility] [place] [facility]    Leg        N/A /



             [id number] [id number] 19

 

 Screw Bone Cannulated Compression Headless 4.7mm Estefanía 40mml  SCREW  Left:  
[employer]      -[id number] /



 Implanted: Qty: 1 on 2019 by Waldo Grace MD at [facility] SPECIALTY 
[facility]    Leg        N/A /



             AUTO1 LOAD2 19

 

 Screw Bone Cannulated Compression Headless 4.7mm Estefanía 35mml  SCREW  Left:  
[employer]       /



 Implanted: Qty: 1 on 2019 by Waldo Grace MD at [facility]    Leg        N/A /



             AUTO1 LOAD2 19

 

 Cortical Block, [facility] Tissue Services Tri Freeze Dried 1.51.9 Cm #1033-12 
- [id number]  TISSUE  Left:  [employer]    2021  1033-12 /



 Implanted: Qty: 1 on 2019 by Waldo Grace MD at [facility] SPECIALTY 
[facility] [place] [facility]      [id number] /



             



documented as of this encounter



Results

Not on filedocumented in this encounter



Insurance







 Payer  Benefit Plan / Group  Subscriber ID  Effective Dates  Phone  Address  
Type

 

 [employer]  [facility]  [id number]  2016-Present      HMO



documented as of this encounter

## 2020-03-11 NOTE — XMS REPORT
Summary of Care

 Created on:2019



Patient:Amanda Traylor

Sex:Female

:1965

External Reference #:[id number]





Demographics







 Address  [address] [place] [address]

 

 Mobile Phone  [phone]

 

 Home Phone  [phone]

 

 Phone  [phone]

 

 Email Address  [email or web address]

 

 Preferred Language  English

 

 Marital Status  

 

 Yazidi Affiliation  Unknown

 

 Race  White

 

 Ethnic Group   or 









Author







 Organization  [facility]

 

 Address  [address] [place] [id number]









Support







 Name  Relationship  Address  Phone

 

 Dilan Traylor  Unavailable  Unavailable  [phone]

 

 Reena Luu  Unavailable  Unavailable  [phone]

 

 Shobha Traylor  Unavailable  Unavailable  [phone]









Care Team Providers







 Name  Role  Phone

 

 Cameron Gutiérrez MD  Primary Care Provider  [phone]









Reason for Referral

Radiology Services (Routine)





 Status  Reason  Specialty  Diagnoses /  Referred By  Referred To



       Procedures  Contact  Contact

 

 New Request    Diagnostic  Diagnoses



Pain  [facility], Jesus,  



     Radiology  



Procedures



XR ANKLE 3+ VW BILATERAL  FNP



  



         [address] [facility] [address]



  



         [place] [address]



  



         Phone:  



         [phone]



  



         Fax:  



         [phone]  





Radiology Services (Routine)





 Status  Reason  Specialty  Diagnoses /  Referred By  Referred To



       Procedures  Contact  Contact

 

 New Request    Diagnostic  Diagnoses



Pain  [facility], Jesus,  



     Radiology  



Procedures



XR ANKLE 3+ VW BILATERAL  FNP



  



         [address] [facility] [address]



  



         [place]  



         [address]



  



         Phone:  



         [phone]



  



         Fax:  



         [phone]  





Radiology Services (Routine)





 Status  Reason  Specialty  Diagnoses /  Referred By  Referred To



       Procedures  Contact  Contact

 

 New Request    Diagnostic  Diagnoses



Pain  [facility], Jesus,  



     Radiology  



Procedures



XR FOOT 3+ VW BILATERAL  FNP



  



         [address] [facility] [address]



  



         [place]  



         [address]



  



         Phone:  



         [phone]



  



         Fax:  



         [phone]  





Radiology Services (Routine)





 Status  Reason  Specialty  Diagnoses /  Referred By  Referred To



       Procedures  Contact  Contact

 

 New Request    Diagnostic  Diagnoses



Pain  [facility], Jesus,  



     Radiology  



Procedures



XR FOOT 3+ VW BILATERAL  FNP



  



         [address] [facility] [address]



  



         [place]  



         [address]



  



         Phone:  



         [phone]



  



         Fax:  



         [phone]  









Reason for Visit

Radiology Services (Routine)





 Status  Reason  Specialty  Diagnoses /  Referred By  Referred To



       Procedures  Contact  Contact

 

 New Request    Diagnostic  Diagnoses



Pain  Jesus Saab,  



     Radiology  



Procedures



XR FOOT 3+ VW BILATERAL  FNP



  



         [address] [facility] [address]



  



         [place]  



         [address]



  



         Phone:  



         [phone]



  



         Fax:  



         [phone]  









Encounter Details







 Date  Type  Department  Care Team  Description

 

 2019  Hospital Encounter  [facility]  Jesus Saab, FNP



  Arrived



     [facility]



  [address] [facility]  



     [address] [place] [place]  [address]



  



     [address]



  [place]  



     [phone]  [address]



  



       [phone] (Fax)  







Allergies

Not on Filedocumented as of this encounter (statuses as of 2019)



Medications

Not on filedocumented as of this encounter (statuses as of 2019)



Active Problems

Not on filedocumented as of this encounter (statuses as of 2019)



Social History







 Tobacco Use  Types  Packs/Day  Years Used  Date

 

 Never Smoker        









 Smokeless Tobacco: Never Used      









 Sex Assigned at Birth  Date Recorded

 

 Not on file  









 Job Start Date  Occupation  Industry

 

 Not on file  Not on file  Not on file









 Travel History  Travel Start  Travel End









 No recent travel history available.



documented as of this encounter



Last Filed Vital Signs

Not on filedocumented in this encounter



Plan of Treatment







 Health Maintenance  Due Date  Last Done  Comments

 

 HEPATITIS C (HCV) SCREEN  1965    

 

 DTaP,Tdap,and Td Vaccines (1 -  1983    



 Tdap)      

 

 PAP SMEAR  1985    

 

 MAMMOGRAM  2004    

 

 COLONOSCOPY  2014    

 

 Zoster Recombinant Vaccine  2014    



 (SHINGRIX) (1 of 2)      

 

 INFLUENZA VACCINE (#1)  2019    

 

 PNEUMOCOCCAL 0-64 YEARS COMBINED  Aged Out    No longer eligible based on



 SERIES      patient's age to complete this



       topic



documented as of this encounter



Procedures







 Procedure Name  Priority  Date/Time  Associated Diagnosis  Comments

 

 XR FOOT 3+ VW  Routine  2019  4:57 PM  Pain  Results for this



 BILATERAL    CDT    procedure are in



         the results



         section.

 

 XR ANKLE 3+ VW  Routine  2019  4:57 PM  Pain  Results for this



 BILATERAL    CDT    procedure are in



         the results



         section.



documented in this encounter



Results

XR ANKLE 3+ VW BILATERAL (2019  4:57 PM CDT)





 Specimen

 

 









 Impressions  Performed At

 

  



  PACS/VR/DOSE



 Bilateral mid foot collapse which can be seen with [facility]



  



 ligament/posterior tibialis tendon incompetency.



  



  



  



 Bilateral hallux valgus deformity.



  



  



  



 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



  



  



  



 No acute bony abnormality is present.



  



  



  



 Questionable syndesmotic widening about the left ankle.



  



   









 Narrative  Performed At

 

 EXAM:



  PACS/VR/DOSE



  



  



 XR ANKLE 3+ VW BILATERAL,



  



  



  



 EXAM:



  



  



  



 XR FOOT 3+ VW BILATERAL



  



  



  



 HISTORY:



  



  



  



 pain Weight bearing please with alignment views



  



  



  



  



  



 COMPARISON:



  



  



  



 None



  



  



  



 FINDINGS:



  



  



  



 Imaging of the left and right foot and left and right ankle demonstrates



  



 bilateral hallux valgus deformities. Left worse than right midfoot  



 collapse



  



 is seen with hindfoot valgus angulation. Widening of the left  



 syndesmosis



  



 is seen. Osseous debris overlies the posterior left tibial talar joint.



  



 Bilateral plantar calcaneal enthesophyte formation is present. Moderate



  



 left calcaneal cuboid joint space narrowing with subchondral sclerosis  



 and



  



 marginal osteophyte formation are seen.



  



   









 Procedure Note

 

 [facility], Radiant Results Inft User - 2019  7:29 PM CDT



EXAM:



 



 XR ANKLE 3+ VW BILATERAL,



 



 EXAM:



 



 XR FOOT 3+ VW BILATERAL



 



 HISTORY:



 



 pain Weight bearing please with alignment views



 



 



 COMPARISON:



 



 None



 



 FINDINGS:



 



 Imaging of the left and right foot and left and right ankle demonstrates



 bilateral hallux valgus deformities. Left worse than right midfoot collapse



 is seen with hindfoot valgus angulation. Widening of the left syndesmosis



 is seen. Osseous debris overlies the posterior left tibial talar joint.



 Bilateral plantar calcaneal enthesophyte formation is present. Moderate



 left calcaneal cuboid joint space narrowing with subchondral sclerosis and



 marginal osteophyte formation are seen.



 



 IMPRESSION



 



 Bilateral mid foot collapse which can be seen with [place]



 ligament/posterior tibialis tendon incompetency.



 



 Bilateral hallux valgus deformity.



 



 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



 



 No acute bony abnormality is present.



 



 Questionable syndesmotic widening about the left ankle.









 Performing Organization  Address  City/State/Zipcode  Phone Number

 

 PACS/VR/DOSE      



XR FOOT 3+ VW BILATERAL (2019  4:57 PM CDT)





 Specimen

 

 









 Impressions  Performed At

 

  



  PACS/VR/DOSE



 Bilateral mid foot collapse which can be seen with [place]



  



 ligament/posterior tibialis tendon incompetency.



  



  



  



 Bilateral hallux valgus deformity.



  



  



  



 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



  



  



  



 No acute bony abnormality is present.



  



  



  



 Questionable syndesmotic widening about the left ankle.



  



   









 Narrative  Performed At

 

 EXAM:



  PACS/VR/DOSE



  



  



 XR ANKLE 3+ VW BILATERAL,



  



  



  



 EXAM:



  



  



  



 XR FOOT 3+ VW BILATERAL



  



  



  



 HISTORY:



  



  



  



 pain Weight bearing please with alignment views



  



  



  



  



  



 COMPARISON:



  



  



  



 None



  



  



  



 FINDINGS:



  



  



  



 Imaging of the left and right foot and left and right ankle demonstrates



  



 bilateral hallux valgus deformities. Left worse than right midfoot  



 collapse



  



 is seen with hindfoot valgus angulation. Widening of the left  



 syndesmosis



  



 is seen. Osseous debris overlies the posterior left tibial talar joint.



  



 Bilateral plantar calcaneal enthesophyte formation is present. Moderate



  



 left calcaneal cuboid joint space narrowing with subchondral sclerosis  



 and



  



 marginal osteophyte formation are seen.



  



   









 Procedure Note

 

 [facility], Radiant Results Inft User - 2019  7:29 PM CDT



EXAM:



 



 XR ANKLE 3+ VW BILATERAL,



 



 EXAM:



 



 XR FOOT 3+ VW BILATERAL



 



 HISTORY:



 



 pain Weight bearing please with alignment views



 



 



 COMPARISON:



 



 None



 



 FINDINGS:



 



 Imaging of the left and right foot and left and right ankle demonstrates



 bilateral hallux valgus deformities. Left worse than right midfoot collapse



 is seen with hindfoot valgus angulation. Widening of the left syndesmosis



 is seen. Osseous debris overlies the posterior left tibial talar joint.



 Bilateral plantar calcaneal enthesophyte formation is present. Moderate



 left calcaneal cuboid joint space narrowing with subchondral sclerosis and



 marginal osteophyte formation are seen.



 



 IMPRESSION



 



 Bilateral mid foot collapse which can be seen with [place]



 ligament/posterior tibialis tendon incompetency.



 



 Bilateral hallux valgus deformity.



 



 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



 



 No acute bony abnormality is present.



 



 Questionable syndesmotic widening about the left ankle.









 Performing Organization  Address  City/State/Zipcode  Phone Number

 

 PACS/VR/DOSE      



documented in this encounter



Visit Diagnoses







 Diagnosis

 

 Pain







 Generalized pain



documented in this encounter



Insurance







 Payer  Benefit Plan / Group  Subscriber ID  Effective Dates  Phone  Address  
Type

 

 [employer]  [facility] [place]  [id number]  2016-Present      O









 Guarantor Name  Account Type  Relation to  Date of Birth  Phone  Billing



     Patient      Address

 

 Amanda Traylor  Personal/Family  Self  1965  [phone]  [address]



         (Home)  [place] [address]



documented as of this encounter

## 2020-03-11 NOTE — EDPHYS
Physician Documentation                                                                           

 [facility]                                                                 

Name: Amanda Traylor                                                                                

Age: 54 yrs                                                                                       

Sex: Female                                                                                       

: 1965                                                                                   

MRN: [id number]                                                                                   

Arrival Date: 2020                                                                          

Time: 18:30                                                                                       

Account#: [id number]                                                                            

Bed Treatment                                                                                     

Private MD: Severiano Irene ED Physician Teofilo Frank                                                                      

HPI:                                                                                              

                                                                                             

20:22 This 54 yrs old  Female presents to ER via Ambulatory with complaints of Finger la1 

      Laceration.                                                                                 

20:22 The patient or guardian reports a laceration, irregular. The complaints affect the      la1 

      palmar aspect of distal phalanx of left thumb. Context: The problem was sustained at        

      home, resulted from cutting bell peppers. Onset: The symptoms/episode began/occurred        

      just prior to arrival. Modifying factors: The symptoms are alleviated by nothing, the       

      symptoms are aggravated by nothing. Severity of symptoms: At their worst the symptoms       

      were mild. The patient has not experienced similar symptoms in the past.                    

                                                                                                  

OB/GYN:                                                                                           

19:26 LMP N/A - Post-menopause                                                                aj1 

                                                                                                  

Historical:                                                                                       

- Allergies:                                                                                      

19:26 Motrin;                                                                                 aj1 

19:26 Mobic;                                                                                  aj1 

- PMHx:                                                                                           

19:26 None;                                                                                   aj1 

                                                                                                  

- Immunization history:: Flu vaccine is not up to date.                                           

- Social history:: Smoking status: Patient/guardian denies using tobacco.                         

                                                                                                  

                                                                                                  

ROS:                                                                                              

20:22 Constitutional: Negative for fever, chills, and weight loss, Eyes: Negative for injury, la1 

      pain, redness, and discharge, ENT: Negative for injury, pain, and discharge, Neck:          

      Negative for injury, pain, and swelling, Cardiovascular: Negative for chest pain,           

      palpitations, and edema, Respiratory: Negative for shortness of breath, cough,              

      wheezing, and pleuritic chest pain, Abdomen/GI: Negative for abdominal pain, nausea,        

      vomiting, diarrhea, and constipation, Back: Negative for injury and pain.                   

20:22 Neuro: Negative for headache, weakness, numbness, tingling, and seizure.                    

20:22 MS/extremity: Positive for laceration.                                                      

                                                                                                  

Exam:                                                                                             

20:23 Constitutional:  This is a well developed, well nourished patient who is awake, alert,  la1 

      and in no acute distress. Head/Face:  Normocephalic, atraumatic. Eyes:  Pupils equal        

      round and reactive to light, extra-ocular motions intact. Cardiovascular:   No pulse        

      deficits. Respiratory:   No increased work of breathing, Skin:  Warm, dry with normal       

      turgor.  Normal color with no rashes, no lesions, and no evidence of cellulitis.            

20:23 Skin: injury, laceration(s), the wound is approximately  1 cm(s), with a depth of  0.5      

      cm(s), of the palmar aspect of distal phalanx of left thumb, that can be described as       

      clean, no foreign body, linear, with mild bleeding, skin avulsion to the distal tip of      

      the palmar aspect of the thumb on the left hand. No involvement of the nail. No tissue      

      left to approximate..                                                                       

                                                                                                  

Vital Signs:                                                                                      

19:24  / 74; Pulse 76; Resp 18; Temp 97.4; Pulse Ox 97% on R/A;                         aj1 

19:27 Weight 84.37 kg (R); Height 5 ft. 6 in. (167.64 cm) (R);                                [facility] 

19:27 Body Mass Index 30.02 (84.37 kg, 167.64 cm)                                             [facility] 

                                                                                                  

Procedures:                                                                                       

20:25 Irrigation of laceration on palmar aspect of distal phalanx of left thumb irrigated     la1 

      with normal saline Patient tolerated well. Performed wound dressing. applied surgicel       

      to thumb wound with non-stick and gauze applied over. pt tolerated well, instructed to      

      FU with PCP.                                                                                

                                                                                                  

MDM:                                                                                              

20:19 Patient medically screened.                                                             [facility] 

20:27 Data reviewed: vital signs, nurses notes, and as a result, I will discharge patient.    la1 

      Data interpreted: Pulse oximetry: on room air is 97 %. Counseling: I had a detailed         

      discussion with the patient and/or guardian regarding: the historical points, exam          

      findings, and any diagnostic results supporting the discharge/admit diagnosis, the need     

      for outpatient follow up, a family practitioner, to return to the emergency department      

      if symptoms worsen or persist or if there are any questions or concerns that arise at       

      home.                                                                                       

                                                                                                  

Administered Medications:                                                                         

20:25 Drug: Tetanus-Diphtheria Toxoid Adult 0.5 ml {: Leeo. Exp:         bb  

      2022. Lot #: [id number]. } Route: IM; Site: right deltoid;                                

20:30 Follow up: Response: Medication administered at discharge.                              gregorio  

                                                                                                  

                                                                                                  

Disposition:                                                                                      

                                                                                             

07:29 Co-signature as Attending Physician, Teofilo Frank MD I agree with the assessment and  dharmesh 

      plan of care.                                                                               

                                                                                                  

Disposition:                                                                                      

20 20:27 Discharged to Home. Impression: Laceration with foreign body of left thumb         

  without damage to nail.                                                                         

- Condition is Stable.                                                                            

- Discharge Instructions: Laceration Care, Adult, Nonsutured Laceration Care.                     

                                                                                                  

- Medication Reconciliation Form, Thank You Letter form.                                          

- Follow up: Private Physician; When: As needed.                                                  

- Problem is new.                                                                                 

- Symptoms have improved.                                                                         

                                                                                                  

                                                                                                  

                                                                                                  

Signatures:                                                                                       

Saniya De La Rosa RN                     RN   aj1                                                  

Teofilo Frank MD MD cha Ballard, Brenda, RN                     RN   bb                                                   

Zachary Prakash, FNP-C                      FNP-Cla1                                                  

                                                                                                  

Corrections: (The following items were deleted from the chart)                                    

                                                                                             

20:30 20:27 2020 20:27 Discharged to Home. Impression: Laceration with foreign body of  bb  

      left thumb without damage to nail. Condition is Stable. Forms are Medication                

      Reconciliation Form, Thank You Letter, Antibiotic Education, Prescription Opioid Use.       

      Follow up: Private Physician; When: As needed. Problem is new. Symptoms have improved.      

      la1                                                                                         

                                                                                                  

**************************************************************************************************